# Patient Record
Sex: MALE | Race: WHITE | NOT HISPANIC OR LATINO | URBAN - METROPOLITAN AREA
[De-identification: names, ages, dates, MRNs, and addresses within clinical notes are randomized per-mention and may not be internally consistent; named-entity substitution may affect disease eponyms.]

---

## 2024-04-29 ENCOUNTER — OV NP (OUTPATIENT)
Dept: URBAN - METROPOLITAN AREA CLINIC 40 | Facility: CLINIC | Age: 34
End: 2024-04-29
Payer: COMMERCIAL

## 2024-04-29 ENCOUNTER — LAB (OUTPATIENT)
Dept: URBAN - METROPOLITAN AREA CLINIC 40 | Facility: CLINIC | Age: 34
End: 2024-04-29

## 2024-04-29 VITALS
TEMPERATURE: 97.3 F | HEIGHT: 69 IN | SYSTOLIC BLOOD PRESSURE: 120 MMHG | HEART RATE: 68 BPM | BODY MASS INDEX: 28.32 KG/M2 | WEIGHT: 191.2 LBS | DIASTOLIC BLOOD PRESSURE: 82 MMHG

## 2024-04-29 DIAGNOSIS — K62.5 RECTAL BLEEDING: ICD-10-CM

## 2024-04-29 DIAGNOSIS — K58.0 IRRITABLE BOWEL SYNDROME WITH DIARRHEA: ICD-10-CM

## 2024-04-29 DIAGNOSIS — K52.9 CHRONIC DIARRHEA: ICD-10-CM

## 2024-04-29 DIAGNOSIS — R19.5 STOOL MUCUS: ICD-10-CM

## 2024-04-29 DIAGNOSIS — F41.9 ANXIETY: ICD-10-CM

## 2024-04-29 DIAGNOSIS — R10.84 GENERALIZED ABDOMINAL PAIN: ICD-10-CM

## 2024-04-29 DIAGNOSIS — R14.0 BLOATING: ICD-10-CM

## 2024-04-29 PROBLEM — 48694002: Status: ACTIVE | Noted: 2024-04-29

## 2024-04-29 PROBLEM — 197125005: Status: ACTIVE | Noted: 2024-04-29

## 2024-04-29 PROCEDURE — 99244 OFF/OP CNSLTJ NEW/EST MOD 40: CPT | Performed by: INTERNAL MEDICINE

## 2024-04-29 RX ORDER — DICYCLOMINE HYDROCHLORIDE 20 MG/1
1 TABLET TABLET ORAL THREE TIMES A DAY
Qty: 270 TABLET | Refills: 3 | OUTPATIENT
Start: 2024-04-29 | End: 2025-04-24

## 2024-04-29 NOTE — HPI-TODAY'S VISIT:
Patient is referred by Dr. Raúl Epstein. A copy of this note will be provided for review. Hx of chronic abdominal pain.  No recent [facility] notes. 2022: CT abd/pelvis and RUQ u/s normal.  2022: CBC/CMP normal. 2022 testicle ultrasound confirmed small hydroceles. Patient presents today with his wife.  He describes a 2-year or greater history of intermittent lower abdominal pain diarrhea cramping and bloating.  About 2 years ago he noticed moderate to significant mid lower abdominal pain associated with yellow stools and bloating.  At that time imaging above was done and negative but he was told he may have an umbilical hernia and that it was oftentimes difficult to diagnose.  After that evaluation symptoms seem to have resolved for about a year and a half but he now has recurrent symptoms.  These include mainly frequent if not daily lower abdominal pain and left lower quadrant pain.  Worse after eating.  Associated with bloating and cramping and out right abdominal sharp pain.  Then he has change in bowels with loose stools 2-3 times daily and relief after defecation generally.  Rare intermittent rectal bleeding noted but none for the past few months.  Frequent mucus stools.  He also has dizziness, lightheadedness, feels like he is floating on water, near syncope like sensation and headache after eating.  He feels his blood sugar is often low.  He has chronic fatigue.  No recent medications or travel that would elicit the symptoms.  It is now worse over the past 2 months.  His weight is stable.  His wife notes he has been belching or what she calls Micrell burps throughout the day especially when he is having abdominal cramps.  A tightness under his belt is also noted.  Intermittent nausea but no vomiting.

## 2024-05-10 ENCOUNTER — TELEPHONE ENCOUNTER (OUTPATIENT)
Dept: URBAN - METROPOLITAN AREA CLINIC 40 | Facility: CLINIC | Age: 34
End: 2024-05-10

## 2024-05-15 ENCOUNTER — OFFICE VISIT (OUTPATIENT)
Dept: URBAN - METROPOLITAN AREA CLINIC 74 | Facility: CLINIC | Age: 34
End: 2024-05-15

## 2024-06-21 ENCOUNTER — WEB ENCOUNTER (OUTPATIENT)
Dept: URBAN - METROPOLITAN AREA CLINIC 40 | Facility: CLINIC | Age: 34
End: 2024-06-21

## 2024-06-25 ENCOUNTER — OFFICE VISIT (OUTPATIENT)
Dept: URBAN - METROPOLITAN AREA SURGERY CENTER 30 | Facility: SURGERY CENTER | Age: 34
End: 2024-06-25

## 2024-07-23 ENCOUNTER — OFFICE VISIT (OUTPATIENT)
Dept: URBAN - METROPOLITAN AREA CLINIC 40 | Facility: CLINIC | Age: 34
End: 2024-07-23

## 2024-08-15 ENCOUNTER — OFFICE VISIT (OUTPATIENT)
Dept: URBAN - METROPOLITAN AREA SURGERY CENTER 30 | Facility: SURGERY CENTER | Age: 34
End: 2024-08-15

## 2024-08-28 ENCOUNTER — OFFICE VISIT (OUTPATIENT)
Dept: URBAN - METROPOLITAN AREA CLINIC 40 | Facility: CLINIC | Age: 34
End: 2024-08-28